# Patient Record
Sex: FEMALE | Race: WHITE | NOT HISPANIC OR LATINO | URBAN - METROPOLITAN AREA
[De-identification: names, ages, dates, MRNs, and addresses within clinical notes are randomized per-mention and may not be internally consistent; named-entity substitution may affect disease eponyms.]

---

## 2017-10-16 NOTE — PATIENT DISCUSSION
(H35.30) Unspecified macular degeneration - Assesment : Examination revealed mild age-related macular degeneration.   OD: RPE CHANGES CENTRAL MACULA  OS: RPE CHANGES NEAR THE NERVE. - Plan : OBSERVATION

## 2017-10-16 NOTE — PATIENT DISCUSSION
(H35.62) Retinal hemorrhage, left eye - Assesment : Examination revealed a retinal hemorrhage.-@ STA  H/O /110 - Plan : 6 WEEKS/ DILATE OS

## 2017-10-16 NOTE — PATIENT DISCUSSION
(H40.013) Open angle with borderline findings, low risk, bilateral - Assesment : Examination revealed suspicion for Open Angle Glaucoma. -LOW  IOP OU STABLE TODAY C/D ASYMMETRY - Plan : Monitor for changes. Advised patient to call our office when decreased vision or increased eye pain. Visual field performed.

## 2017-10-16 NOTE — PATIENT DISCUSSION
(Z78.335) Vitreous degeneration, bilateral - Assesment : Examination revealed PVD - Plan : Monitor for changes. Advised patient to call our office with decreased vision or an increase in flashes and/or floaters.

## 2017-11-27 NOTE — PATIENT DISCUSSION
(H35.62) Retinal hemorrhage, left eye - Assesment : Examination revealed a retinal hemorrhage.-@ STA -RESOLVED  H/O /110 - Plan : OBSERVATION. CALL WITH DECREASED VISION.   Randon Mcardle / Rusty Lyles

## 2018-07-23 NOTE — PATIENT DISCUSSION
(T01.1647) Nonexudative age-related macular degeneration bilateral bebo - Assesment : Examination revealed AMD Dry-mild - Plan : Monitor for changes. Advised patient to call our office with decreased vision or increased distortion.  Amsler grid

## 2018-07-23 NOTE — PATIENT DISCUSSION
(H40.013) Open angle with borderline findings, low risk, bilateral - Assesment : Examination revealed suspicion for Open Angle Glaucoma based on increased C/D and asymmetry. Iop stable today - Plan : Monitor for changes. Advised patient to call our office when decreased vision or increased eye pain.   Rtc 6 months VF 24-2/Tn ford

## 2018-07-23 NOTE — PATIENT DISCUSSION
(H35.62) Retinal hemorrhage, left eye - Assesment : Examination revealed a retinal hemorrhage.-@ STA -RESOLVED  H/O /110 - Plan : Observation

## 2019-01-17 NOTE — PATIENT DISCUSSION
(H40.013) Open angle with borderline findings, low risk, bilateral - Assesment : Examination revealed suspicion for Open Angle Glaucoma based on increased C/D and asymmetry. Iop stable today - Plan : Monitor for changes. Advised patient to call our office when decreased vision or increased eye pain. Visual field performed.   6 MONTHS EXAM ONH PHOTOS

## 2019-05-10 NOTE — PATIENT DISCUSSION
(E19.0020) Nonexudative age-related macular degeneration bilateral bebo - Assesment : Examination revealed AMD Dry-mild - Plan : Monitor for changes. Advised patient to call our office with decreased vision or increased distortion.  Amsler grid

## 2019-10-04 NOTE — PATIENT DISCUSSION
(H11.431) Conjunctival hyperemia, right eye - Assesment : Examination Revealed Lid margin Hyperemia and increased inflammation - Plan : Start Alrex QD-BID OD (Samples given to patient) Samples of Systane Complete and Thera Tears given to be used BID-TID OU for dryness

## 2019-10-04 NOTE — PATIENT DISCUSSION
(W92.545) Vitreous degeneration, bilateral - Assesment : Examination revealed PVD OU. - Plan : Monitor for changes. Advised patient to call our office with decreased vision or an increase in flashes and/or floaters.

## 2019-10-04 NOTE — PATIENT DISCUSSION
(H40.013) Open angle with borderline findings, low risk, bilateral - Assesment : Examination revealed suspicion for Open Angle Glaucoma based on increased C/D and asymmetry. IOP stable OU - Plan : Monitor for changes. Advised patient to call our office when decreased vision or increased eye pain.   RV 1 year Exam/ONH OCT

## 2019-10-04 NOTE — PATIENT DISCUSSION
(M29.793) Keratoconjunct sicca, not specified as Sjogren's, bilateral - Assesment : Examination revealed Dry Eye Syndrome - Plan : Monitor for changes. Advised patient to call our office with decreased vision or increased symptoms.  Patient to try changing AT's, Samples of Systane complete and Thera Tears given to patient to use BID-TID OU

## 2019-10-04 NOTE — PATIENT DISCUSSION
(E47.9160) Nonexudative age-related macular degeneration bilateral bebo - Assesment : Examination revealed AMD Dry-mild - Plan : Monitor for changes. Advised patient to call our office with decreased vision or increased distortion.  Amsler grid

## 2021-10-14 ENCOUNTER — NEW PATIENT (OUTPATIENT)
Dept: URBAN - METROPOLITAN AREA CLINIC 17 | Facility: CLINIC | Age: 86
End: 2021-10-14

## 2021-10-14 DIAGNOSIS — H52.4: ICD-10-CM

## 2021-10-14 DIAGNOSIS — H43.813: ICD-10-CM

## 2021-10-14 DIAGNOSIS — H04.123: ICD-10-CM

## 2021-10-14 DIAGNOSIS — H53.8: ICD-10-CM

## 2021-10-14 PROCEDURE — 92134 CPTRZ OPH DX IMG PST SGM RTA: CPT

## 2021-10-14 PROCEDURE — 99204 OFFICE O/P NEW MOD 45 MIN: CPT

## 2021-10-14 PROCEDURE — 92015 DETERMINE REFRACTIVE STATE: CPT

## 2021-10-14 ASSESSMENT — VISUAL ACUITY
OS_PH: 20/200-1
OS_SC: 20/400
OD_SC: 20/400

## 2021-10-14 ASSESSMENT — TONOMETRY
OD_IOP_MMHG: 12
OS_IOP_MMHG: 11

## 2023-06-04 NOTE — PATIENT DISCUSSION
My findings and recommendations are based on patient's symptoms, eye exam, diagnostic testing, and records. None